# Patient Record
Sex: MALE | Race: WHITE | NOT HISPANIC OR LATINO | Employment: OTHER | URBAN - METROPOLITAN AREA
[De-identification: names, ages, dates, MRNs, and addresses within clinical notes are randomized per-mention and may not be internally consistent; named-entity substitution may affect disease eponyms.]

---

## 2017-10-13 ENCOUNTER — TELEPHONE (OUTPATIENT)
Dept: OPHTHALMOLOGY | Facility: CLINIC | Age: 67
End: 2017-10-13

## 2017-10-13 NOTE — TELEPHONE ENCOUNTER
No answer. Left message that dept  will attempt a call again on Monday to schedule requested appointment.

## 2017-10-13 NOTE — TELEPHONE ENCOUNTER
----- Message from Shashi Siddiqi sent at 10/12/2017  1:31 PM CDT -----  Contact: pt  He's calling in regards to scheduling an appt, [phone] ([place])